# Patient Record
Sex: MALE | Race: WHITE | NOT HISPANIC OR LATINO
[De-identification: names, ages, dates, MRNs, and addresses within clinical notes are randomized per-mention and may not be internally consistent; named-entity substitution may affect disease eponyms.]

---

## 2020-11-27 ENCOUNTER — TRANSCRIPTION ENCOUNTER (OUTPATIENT)
Age: 20
End: 2020-11-27

## 2024-04-22 PROBLEM — Z00.00 ENCOUNTER FOR PREVENTIVE HEALTH EXAMINATION: Status: ACTIVE | Noted: 2024-04-22

## 2024-04-30 ENCOUNTER — APPOINTMENT (OUTPATIENT)
Dept: UROLOGY | Facility: CLINIC | Age: 24
End: 2024-04-30
Payer: COMMERCIAL

## 2024-04-30 VITALS
OXYGEN SATURATION: 98 % | SYSTOLIC BLOOD PRESSURE: 134 MMHG | HEART RATE: 59 BPM | BODY MASS INDEX: 25.18 KG/M2 | HEIGHT: 73 IN | TEMPERATURE: 97.8 F | WEIGHT: 190 LBS | DIASTOLIC BLOOD PRESSURE: 80 MMHG

## 2024-04-30 DIAGNOSIS — Z78.9 OTHER SPECIFIED HEALTH STATUS: ICD-10-CM

## 2024-04-30 DIAGNOSIS — I86.1 SCROTAL VARICES: ICD-10-CM

## 2024-04-30 PROCEDURE — 76870 US EXAM SCROTUM: CPT

## 2024-04-30 PROCEDURE — 99204 OFFICE O/P NEW MOD 45 MIN: CPT

## 2024-04-30 RX ORDER — MINOXIDIL 2.5 MG/1
2.5 TABLET ORAL
Refills: 0 | Status: ACTIVE | COMMUNITY

## 2024-04-30 RX ORDER — FINASTERIDE 5 MG/1
5 TABLET, FILM COATED ORAL
Refills: 0 | Status: ACTIVE | COMMUNITY

## 2024-04-30 NOTE — HISTORY OF PRESENT ILLNESS
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

## 2024-04-30 NOTE — ASSESSMENT
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 Hal Mcmillan for testicular pain which was associated with exercise.  He had complete resolution of this pain until 6 months ago.  When he again began experiencing achiness.  He states this occurs when sitting for prolonged periods of time as well as with exercise.  He does have relief scrotal support.  His partner felt a scrotal abnormality he returned to Dr. Mcmillan who diagnosed recurrent varicocele and referred him for management.  On examination there is a grade 2 varicocele on the left.  There was a question of a varicocele on the right however ultrasound evaluation did not confirm this.  A left varicocele was confirmed with reflux.  There is also calcification of the tunica albuginea.  The testicle on the left side is mildly atrophic compared to the right.  The findings of a varicocele were discussed with the patient.  We discussed indications for varicocelectomy: 1, fertility, 2. ? developement of hypogonadism 3. Cosmesis 4. Pain  We discussed his pain does not appear to be related to the varicocele.  We discussed conservative measures for the management of his discomfort at this time.  The patient wished to proceed with a semen analysis and endocrine studies to assess his current status.  He is concerned regarding his fertility.  We discussed the fact that in light of previous surgical intervention with a subinguinal approach, I would be likely to recommend venoocclusive approach to the recurrent varicocele rather than surgical intervention.  We discussed this procedure and the rationale for this as well. The TAO MARIE  expressed fully understanding of the information provided, the consequences and the management.  Plan: 1.  Endocrine studies 2.  Semen analysis 3.  Follow-up to discuss findings and recommendation

## 2024-04-30 NOTE — PHYSICAL EXAM
[Bowel Sounds] : normal bowel sounds [Abdomen Tenderness] : non-tender [Urethral Meatus] : meatus normal [Penis Abnormality] : normal circumcised penis [Epididymis] : the epididymides were normal [Testes Tenderness] : no tenderness of the testes [Testes Mass (___cm)] : there were no testicular masses [de-identified] : healed subinguinal scars [de-identified] : left tesicle< right; grade 2 varicocele on left; ? right grade 1

## 2024-04-30 NOTE — LETTER BODY
[Please see my note below.] : Please see my note below. [FreeTextEntry2] : Hal Mcmillan MD  [FreeTextEntry1] : Dear Hal,   Thank you for your kind referral.  I am enclosing a copy of my office note for your information.   I will keep you informed of any developments.   Feel free to contact me if you have any questions.   Sincerely,   Minh Hernandez MD, FACS Professor of Urology [place] [address]   Office Telephone [phone]   Fax [phone]

## 2024-05-01 LAB
ESTRADIOL SERPL-MCNC: 45 PG/ML
FSH SERPL-MCNC: 5.1 IU/L
LH SERPL-ACNC: 6.4 IU/L
TESTOST SERPL-MCNC: 550 NG/DL

## 2024-05-20 ENCOUNTER — APPOINTMENT (OUTPATIENT)
Dept: UROLOGY | Facility: CLINIC | Age: 24
End: 2024-05-20

## 2024-05-20 DIAGNOSIS — Q55.9 CONGENITAL MALFORMATION OF MALE GENITAL ORGAN, UNSPECIFIED: ICD-10-CM

## 2024-05-21 ENCOUNTER — APPOINTMENT (OUTPATIENT)
Dept: UROLOGY | Facility: CLINIC | Age: 24
End: 2024-05-21
Payer: COMMERCIAL

## 2024-05-21 DIAGNOSIS — R86.8 OTHER ABNORMAL FINDINGS IN SPECIMENS FROM MALE GENITAL ORGANS: ICD-10-CM

## 2024-05-21 DIAGNOSIS — N50.812 LEFT TESTICULAR PAIN: ICD-10-CM

## 2024-05-21 DIAGNOSIS — I86.1 SCROTAL VARICES: ICD-10-CM

## 2024-05-21 PROCEDURE — 99214 OFFICE O/P EST MOD 30 MIN: CPT

## 2024-05-21 NOTE — ASSESSMENT
[FreeTextEntry1] : The findings of a varicocele were discussed with the patient.  Discussed indications for varicocelectomy: 1, fertility, 2. ? development of hypogonadism 3. Cosmesis 4. Pain  left varicocele mild asymmetry left testicular tunica    discussed venography  Plan: semen analysis   5.21.2024 returns  after semen analysis  volume 2.1 [44.5] tc 93.45 Motility  84.3 % grade 3/4 morphology 1%  poor predictive value of semen analysis  reviewed potential significance of  abnormal morphology discussed potentila antioxidants repeat semen analysis  PROXEED/PROFERTIL discussed lack of evidence of antioxidant therapy repeat semen analysis after antioxidant discussed symptoms  Discussed indications for varicocelectomy: 1, fertility, 2. ? developement of hypogonadism 3. Cosmesis 4. Pain The DELORES CRUZ  expressed fully understanding of the information provided, the consequences and the management. He is not attempting pregnancy at this time and therefore indication would be: 1. symptoms which are mild 2. preemptive treatment of isolated morphology abnormality Discussed poor response of isolated morphology abnormalities and therefore will  treat as above The DELORES CRUZ  expressed fully understanding of the information provided, the consequences and the management.  plan: semen analysis  proxeed fu in 4 months b     5.20.2024 returns by telemedicine

## 2025-03-24 ENCOUNTER — NON-APPOINTMENT (OUTPATIENT)
Age: 25
End: 2025-03-24

## 2025-03-24 ENCOUNTER — APPOINTMENT (OUTPATIENT)
Dept: UROLOGY | Facility: CLINIC | Age: 25
End: 2025-03-24
Payer: COMMERCIAL

## 2025-03-24 VITALS
SYSTOLIC BLOOD PRESSURE: 136 MMHG | TEMPERATURE: 97.6 F | HEART RATE: 65 BPM | DIASTOLIC BLOOD PRESSURE: 75 MMHG | OXYGEN SATURATION: 97 %

## 2025-03-24 DIAGNOSIS — I86.1 SCROTAL VARICES: ICD-10-CM

## 2025-03-24 DIAGNOSIS — Q55.9 CONGENITAL MALFORMATION OF MALE GENITAL ORGAN, UNSPECIFIED: ICD-10-CM

## 2025-03-24 PROCEDURE — 99214 OFFICE O/P EST MOD 30 MIN: CPT

## 2025-04-22 ENCOUNTER — APPOINTMENT (OUTPATIENT)
Dept: UROLOGY | Facility: CLINIC | Age: 25
End: 2025-04-22
Payer: COMMERCIAL

## 2025-04-22 DIAGNOSIS — I86.1 SCROTAL VARICES: ICD-10-CM

## 2025-04-22 PROCEDURE — 76870 US EXAM SCROTUM: CPT

## 2025-04-22 PROCEDURE — 99214 OFFICE O/P EST MOD 30 MIN: CPT

## 2025-07-30 ENCOUNTER — APPOINTMENT (OUTPATIENT)
Dept: UROLOGY | Facility: CLINIC | Age: 25
End: 2025-07-30

## 2025-08-01 ENCOUNTER — NON-APPOINTMENT (OUTPATIENT)
Age: 25
End: 2025-08-01

## 2025-09-15 ENCOUNTER — APPOINTMENT (OUTPATIENT)
Dept: UROLOGY | Facility: CLINIC | Age: 25
End: 2025-09-15
Payer: COMMERCIAL

## 2025-09-15 VITALS
DIASTOLIC BLOOD PRESSURE: 77 MMHG | HEART RATE: 56 BPM | SYSTOLIC BLOOD PRESSURE: 138 MMHG | BODY MASS INDEX: 25.18 KG/M2 | WEIGHT: 190 LBS | TEMPERATURE: 97.1 F | OXYGEN SATURATION: 100 % | HEIGHT: 73 IN

## 2025-09-15 DIAGNOSIS — N50.812 LEFT TESTICULAR PAIN: ICD-10-CM

## 2025-09-15 DIAGNOSIS — I86.1 SCROTAL VARICES: ICD-10-CM

## 2025-09-15 PROCEDURE — 99204 OFFICE O/P NEW MOD 45 MIN: CPT

## 2025-09-16 LAB
ANION GAP SERPL CALC-SCNC: 15 MMOL/L
BUN SERPL-MCNC: 14 MG/DL
CALCIUM SERPL-MCNC: 9.8 MG/DL
CHLORIDE SERPL-SCNC: 103 MMOL/L
CO2 SERPL-SCNC: 24 MMOL/L
CREAT SERPL-MCNC: 1.07 MG/DL
EGFRCR SERPLBLD CKD-EPI 2021: 99 ML/MIN/1.73M2
GLUCOSE SERPL-MCNC: 85 MG/DL
HCT VFR BLD CALC: 43.4 %
HGB BLD-MCNC: 14.3 G/DL
MCHC RBC-ENTMCNC: 30.6 PG
MCHC RBC-ENTMCNC: 32.9 G/DL
MCV RBC AUTO: 92.9 FL
PLATELET # BLD AUTO: 248 K/UL
POTASSIUM SERPL-SCNC: 4.5 MMOL/L
RBC # BLD: 4.67 M/UL
RBC # FLD: 12.8 %
SODIUM SERPL-SCNC: 143 MMOL/L
TESTOST SERPL-MCNC: 509 NG/DL
WBC # FLD AUTO: 6.14 K/UL